# Patient Record
Sex: MALE | URBAN - METROPOLITAN AREA
[De-identification: names, ages, dates, MRNs, and addresses within clinical notes are randomized per-mention and may not be internally consistent; named-entity substitution may affect disease eponyms.]

---

## 2023-01-11 ENCOUNTER — NEW PATIENT COMPREHENSIVE (OUTPATIENT)
Dept: URBAN - METROPOLITAN AREA CLINIC 94 | Facility: CLINIC | Age: 73
End: 2023-01-11

## 2023-01-11 DIAGNOSIS — H25.13: ICD-10-CM

## 2023-01-11 DIAGNOSIS — H04.223: ICD-10-CM

## 2023-01-11 PROCEDURE — 92004 COMPRE OPH EXAM NEW PT 1/>: CPT

## 2023-01-11 ASSESSMENT — VISUAL ACUITY
OS_CC: 20/20
OD_CC: 20/20

## 2023-01-11 ASSESSMENT — TONOMETRY
OD_IOP_MMHG: 16
OS_IOP_MMHG: 16

## 2023-03-29 ENCOUNTER — NON-APPOINTMENT (OUTPATIENT)
Age: 73
End: 2023-03-29

## 2023-04-04 ENCOUNTER — OUTPATIENT (OUTPATIENT)
Dept: OUTPATIENT SERVICES | Facility: HOSPITAL | Age: 73
LOS: 1 days | End: 2023-04-04

## 2023-04-04 ENCOUNTER — APPOINTMENT (OUTPATIENT)
Dept: OTOLARYNGOLOGY | Facility: CLINIC | Age: 73
End: 2023-04-04
Payer: MEDICARE

## 2023-04-04 ENCOUNTER — APPOINTMENT (OUTPATIENT)
Dept: CT IMAGING | Facility: CLINIC | Age: 73
End: 2023-04-04
Payer: MEDICARE

## 2023-04-04 VITALS
WEIGHT: 230 LBS | BODY MASS INDEX: 34.86 KG/M2 | DIASTOLIC BLOOD PRESSURE: 72 MMHG | SYSTOLIC BLOOD PRESSURE: 150 MMHG | HEART RATE: 93 BPM | OXYGEN SATURATION: 97 % | HEIGHT: 68 IN | TEMPERATURE: 97.8 F

## 2023-04-04 DIAGNOSIS — E78.5 HYPERLIPIDEMIA, UNSPECIFIED: ICD-10-CM

## 2023-04-04 DIAGNOSIS — I25.10 ATHEROSCLEROTIC HEART DISEASE OF NATIVE CORONARY ARTERY W/OUT ANGINA PECTORIS: ICD-10-CM

## 2023-04-04 DIAGNOSIS — Z87.891 PERSONAL HISTORY OF NICOTINE DEPENDENCE: ICD-10-CM

## 2023-04-04 DIAGNOSIS — E11.9 TYPE 2 DIABETES MELLITUS W/OUT COMPLICATIONS: ICD-10-CM

## 2023-04-04 DIAGNOSIS — I10 ESSENTIAL (PRIMARY) HYPERTENSION: ICD-10-CM

## 2023-04-04 DIAGNOSIS — H61.23 IMPACTED CERUMEN, BILATERAL: ICD-10-CM

## 2023-04-04 PROBLEM — Z00.00 ENCOUNTER FOR PREVENTIVE HEALTH EXAMINATION: Status: ACTIVE | Noted: 2023-04-04

## 2023-04-04 PROCEDURE — 31231 NASAL ENDOSCOPY DX: CPT

## 2023-04-04 PROCEDURE — 69210 REMOVE IMPACTED EAR WAX UNI: CPT

## 2023-04-04 PROCEDURE — 99204 OFFICE O/P NEW MOD 45 MIN: CPT | Mod: 25

## 2023-04-04 PROCEDURE — 70486 CT MAXILLOFACIAL W/O DYE: CPT | Mod: 26,MH

## 2023-04-04 RX ORDER — FEXOFENADINE HCL 60 MG
CAPSULE ORAL
Refills: 0 | Status: ACTIVE | COMMUNITY

## 2023-04-04 NOTE — ASSESSMENT
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 I asked them to bring his sleep study as his AHI is just on the cusp of what is allowed at the [facility]; additionally they will bring whatever they can find at home regarding his prior surgery. \par \par We will also submit his 11/22 CT CD for upload.

## 2023-04-04 NOTE — DATA REVIEWED
[de-identified] : Bx report from 3/23: IP w/o malignant change [de-identified] : CT sinus outside report 11/22: opac'n of max sinuses complete on the L; mild to mod eth & frontal dz, L side polyps; R NSD

## 2023-04-04 NOTE — PROCEDURE
[FreeTextEntry6] : Indication: requirement for exam not possible via anterior rhinoscopy; polyps\par After verbal consent and the administration of an aerosolized phenylephrine/lidocaine mix, examination was performed with a flexible endoscope attached to a video monitoring system. 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

## 2023-04-04 NOTE — PHYSICAL EXAM
[Binocular Microscopic Exam] : Binocular microscopic exam was performed [FreeTextEntry8] : obstructing cerumen removed with a loop [FreeTextEntry9] : obstructing cerumen removed with a loop [Nasal Endoscopy Performed] : nasal endoscopy was performed, see procedure section for findings [] : septum deviated to the right [Normal] : no masses and lesions seen, face is symmetric

## 2023-04-04 NOTE — HISTORY OF PRESENT ILLNESS
[de-identified] : Hx of a L sided polyp excised in the late '90s that required a ? Silva Mandeep approach; then summer 2022 he noticed trouble breathing and L sided nasal polyp biopsied w/ a result of IP. Ongoing trouble breathing through his L nose. \par Sees an allergist for perennial allergies- he intermittently takes Dymista or Xhance\par GREG on nightly CPAP & is actively followed by a pulm. \par Hx of a lot of impactions; denies HL or tinnitus.

## 2023-04-07 ENCOUNTER — APPOINTMENT (OUTPATIENT)
Dept: OTOLARYNGOLOGY | Facility: CLINIC | Age: 73
End: 2023-04-07
Payer: MEDICARE

## 2023-04-07 VITALS
HEART RATE: 68 BPM | BODY MASS INDEX: 34.86 KG/M2 | HEIGHT: 68 IN | WEIGHT: 230 LBS | OXYGEN SATURATION: 95 % | SYSTOLIC BLOOD PRESSURE: 156 MMHG | DIASTOLIC BLOOD PRESSURE: 78 MMHG | TEMPERATURE: 98 F

## 2023-04-07 PROCEDURE — 31231 NASAL ENDOSCOPY DX: CPT

## 2023-04-07 PROCEDURE — 99215 OFFICE O/P EST HI 40 MIN: CPT | Mod: 25

## 2023-04-07 RX ORDER — HYDROCODONE BITARTRATE AND ACETAMINOPHEN 5; 325 MG/1; MG/1
5-325 TABLET ORAL
Qty: 15 | Refills: 0 | Status: ACTIVE | COMMUNITY
Start: 2023-04-07 | End: 1900-01-01

## 2023-04-07 RX ORDER — BACITRACIN 500 [IU]/G
500 OINTMENT TOPICAL
Qty: 1 | Refills: 0 | Status: ACTIVE | COMMUNITY
Start: 2023-04-07 | End: 1900-01-01

## 2023-04-07 RX ORDER — SOD CHLOR,BICARB/SQUEEZ BOTTLE
PACKET, WITH RINSE DEVICE NASAL
Qty: 1 | Refills: 0 | Status: ACTIVE | COMMUNITY
Start: 2023-04-07 | End: 1900-01-01

## 2023-04-07 NOTE — DATA REVIEWED
[de-identified] : Bx report from 3/23: IP w/o malignant change [de-identified] : CT sinus outside report 11/22: opac'n of max sinuses complete on the L; mild to mod eth & frontal dz, L side polyps; R NSD\par CT 4/23 reviewed w/ pt: small R MRC; mass as noted, no obvious hyperostosis to indicate an attachment site. Improved L max & eth ventilation c/w 11/22

## 2023-04-07 NOTE — PROCEDURE
[Image(s) Captured] : image(s) captured and filed [FreeTextEntry6] : Indication: requirement for exam not possible via anterior rhinoscopy; further eval mass\par After verbal consent and the administration of an aerosolized phenylephrine/lidocaine mix, examination was performed with a rigid 30 deg endoscope attached to a video monitoring system. 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

## 2023-04-07 NOTE — HISTORY OF PRESENT ILLNESS
[de-identified] : Hx of a L sided IP excised in the late '90s that required a Silva Mandeep approach; then summer 2022 he noticed trouble breathing and L sided nasal polyp biopsied w/ a result of IP. Ongoing trouble breathing through his L nose. Now s/p rpt CT\par Sees an allergist for perennial allergies- he intermittently takes Dymista or Xhance\par GREG on nightly CPAP & is actively followed by a pulm. \par Hx of a lot of impactions; denies HL or tinnitus.

## 2023-04-07 NOTE — PHYSICAL EXAM
[Nasal Endoscopy Performed] : nasal endoscopy was performed, see procedure section for findings [] : septum deviated to the right [de-identified] : large L nasal cavity mass [Normal] : the left external ear was normal

## 2023-04-07 NOTE — ASSESSMENT
[FreeTextEntry1] : We reviewed his old op report which was brought today indicating a CL approach to resection of an IP; however, the exact attachment site was not noted. We reviewed the high recurrence rate of IP. He would like to proceed w/ surgery which I explained seems likely to be able to be performed without a Silva Mandeep but we will consent him in case this is necessary. \par Fully discussed perioperative care and the risks and benefits of sinus surgery, including the possible need for a septoplasty, and turbinoplasty if appropriate; this included but was not limited to cerebrospinal fluid leak, damage to the orbit, the need for more surgery, septal perforation, nosebleed, loss of sense of smell, infection, a saddle nose deformity. The patient expressed understanding and desires to proceed. An educational perioperative care handout was given with instructions to purchase a rinse kit.\par [facility] Reference #: [id number]

## 2023-04-12 ENCOUNTER — APPOINTMENT (OUTPATIENT)
Age: 73
End: 2023-04-12
Payer: MEDICARE

## 2023-04-12 ENCOUNTER — RESULT REVIEW (OUTPATIENT)
Age: 73
End: 2023-04-12

## 2023-04-12 PROCEDURE — 30130 EXCISE INFERIOR TURBINATE: CPT | Mod: LT

## 2023-04-12 PROCEDURE — 30118 REMOVAL OF INTRANASAL LESION: CPT

## 2023-04-12 PROCEDURE — 61782 SCAN PROC CRANIAL EXTRA: CPT

## 2023-04-12 PROCEDURE — 31255 NSL/SINS NDSC W/TOT ETHMDCT: CPT | Mod: LT,22

## 2023-04-12 PROCEDURE — 31267 ENDOSCOPY MAXILLARY SINUS: CPT | Mod: LT,52,59

## 2023-04-12 PROCEDURE — 31032 EXPLORE SINUS REMOVE POLYPS: CPT | Mod: LT

## 2023-04-13 RX ORDER — CHLORHEXIDINE GLUCONATE, 0.12% ORAL RINSE 1.2 MG/ML
0.12 SOLUTION DENTAL
Qty: 1 | Refills: 0 | Status: ACTIVE | COMMUNITY
Start: 2023-04-13 | End: 1900-01-01

## 2023-04-24 ENCOUNTER — APPOINTMENT (OUTPATIENT)
Dept: OTOLARYNGOLOGY | Facility: CLINIC | Age: 73
End: 2023-04-24
Payer: MEDICARE

## 2023-04-24 VITALS
TEMPERATURE: 97.9 F | SYSTOLIC BLOOD PRESSURE: 132 MMHG | HEART RATE: 72 BPM | OXYGEN SATURATION: 97 % | WEIGHT: 230 LBS | HEIGHT: 68 IN | BODY MASS INDEX: 34.86 KG/M2 | DIASTOLIC BLOOD PRESSURE: 83 MMHG

## 2023-04-24 PROCEDURE — 99024 POSTOP FOLLOW-UP VISIT: CPT

## 2023-04-24 PROCEDURE — 31237 NSL/SINS NDSC SURG BX POLYPC: CPT | Mod: 58,LT

## 2023-04-24 NOTE — REASON FOR VISIT
[Post-Operative Visit] : a post-operative visit [FreeTextEntry2] : postop FESS for a L max/ethmoid IP

## 2023-04-24 NOTE — PROCEDURE
[FreeTextEntry6] : Indication: requirement for postoperative debridement\par After verbal consent and the administration of an aerosolized phenylephrine/lidocaine mix, examination and debridement was performed with rigid 30 and 70 deg endoscopes\par debridement performed using suction and forceps as required; well-healing antrostomy and ethmoid with edematous maxillary sinus mucosa

## 2023-05-04 ENCOUNTER — APPOINTMENT (OUTPATIENT)
Dept: OTOLARYNGOLOGY | Facility: CLINIC | Age: 73
End: 2023-05-04
Payer: MEDICARE

## 2023-05-04 VITALS
HEIGHT: 68 IN | SYSTOLIC BLOOD PRESSURE: 127 MMHG | HEART RATE: 70 BPM | OXYGEN SATURATION: 97 % | TEMPERATURE: 97.8 F | WEIGHT: 225 LBS | BODY MASS INDEX: 34.1 KG/M2 | DIASTOLIC BLOOD PRESSURE: 76 MMHG

## 2023-05-04 DIAGNOSIS — Z48.89 ENCOUNTER FOR OTHER SPECIFIED SURGICAL AFTERCARE: ICD-10-CM

## 2023-05-04 PROCEDURE — 31237 NSL/SINS NDSC SURG BX POLYPC: CPT | Mod: LT,79

## 2023-05-04 PROCEDURE — 99024 POSTOP FOLLOW-UP VISIT: CPT

## 2023-05-04 NOTE — ASSESSMENT
[FreeTextEntry1] : Doing well; further care discussed. He is leaving to spend time in [place] and will return to see us as soon as he gets back.

## 2023-10-30 ENCOUNTER — APPOINTMENT (OUTPATIENT)
Dept: OTOLARYNGOLOGY | Facility: CLINIC | Age: 73
End: 2023-10-30
Payer: MEDICARE

## 2023-10-30 VITALS
OXYGEN SATURATION: 94 % | BODY MASS INDEX: 35.31 KG/M2 | SYSTOLIC BLOOD PRESSURE: 159 MMHG | WEIGHT: 233 LBS | HEIGHT: 68 IN | DIASTOLIC BLOOD PRESSURE: 83 MMHG | TEMPERATURE: 98 F | HEART RATE: 64 BPM

## 2023-10-30 DIAGNOSIS — G47.30 SLEEP APNEA, UNSPECIFIED: ICD-10-CM

## 2023-10-30 DIAGNOSIS — J30.89 OTHER ALLERGIC RHINITIS: ICD-10-CM

## 2023-10-30 DIAGNOSIS — D36.9 BENIGN NEOPLASM, UNSPECIFIED SITE: ICD-10-CM

## 2023-10-30 PROCEDURE — 31231 NASAL ENDOSCOPY DX: CPT

## 2023-10-30 PROCEDURE — 99214 OFFICE O/P EST MOD 30 MIN: CPT | Mod: 25

## 2023-10-30 RX ORDER — ATORVASTATIN CALCIUM 20 MG/1
20 TABLET, FILM COATED ORAL
Refills: 0 | Status: ACTIVE | COMMUNITY

## 2023-10-30 RX ORDER — IRBESARTAN 300 MG/1
TABLET ORAL
Refills: 0 | Status: ACTIVE | COMMUNITY

## 2023-10-30 RX ORDER — METFORMIN HYDROCHLORIDE 500 MG/1
500 TABLET, COATED ORAL
Refills: 0 | Status: ACTIVE | COMMUNITY

## 2024-03-25 ENCOUNTER — APPOINTMENT (OUTPATIENT)
Dept: OTOLARYNGOLOGY | Facility: CLINIC | Age: 74
End: 2024-03-25